# Patient Record
Sex: MALE | Race: WHITE | Employment: STUDENT | URBAN - METROPOLITAN AREA
[De-identification: names, ages, dates, MRNs, and addresses within clinical notes are randomized per-mention and may not be internally consistent; named-entity substitution may affect disease eponyms.]

---

## 2024-07-28 ENCOUNTER — HOSPITAL ENCOUNTER (EMERGENCY)
Facility: HOSPITAL | Age: 19
Discharge: HOME | End: 2024-07-28
Attending: STUDENT IN AN ORGANIZED HEALTH CARE EDUCATION/TRAINING PROGRAM
Payer: COMMERCIAL

## 2024-07-28 VITALS
DIASTOLIC BLOOD PRESSURE: 67 MMHG | TEMPERATURE: 97.3 F | SYSTOLIC BLOOD PRESSURE: 134 MMHG | OXYGEN SATURATION: 98 % | HEART RATE: 62 BPM | WEIGHT: 180.78 LBS | RESPIRATION RATE: 18 BRPM

## 2024-07-28 DIAGNOSIS — S61.215A LACERATION OF LEFT RING FINGER WITHOUT FOREIGN BODY WITHOUT DAMAGE TO NAIL, INITIAL ENCOUNTER: Primary | ICD-10-CM

## 2024-07-28 PROCEDURE — 6370000000 HC RX 637 (ALT 250 FOR IP): Performed by: STUDENT IN AN ORGANIZED HEALTH CARE EDUCATION/TRAINING PROGRAM

## 2024-07-28 PROCEDURE — 99283 EMERGENCY DEPT VISIT LOW MDM: CPT

## 2024-07-28 RX ORDER — IBUPROFEN 600 MG/1
600 TABLET ORAL ONCE
Status: COMPLETED | OUTPATIENT
Start: 2024-07-28 | End: 2024-07-28

## 2024-07-28 RX ADMIN — IBUPROFEN 600 MG: 600 TABLET, FILM COATED ORAL at 22:39

## 2024-07-28 ASSESSMENT — PAIN DESCRIPTION - DESCRIPTORS: DESCRIPTORS: SHARP

## 2024-07-28 ASSESSMENT — PAIN DESCRIPTION - FREQUENCY: FREQUENCY: CONTINUOUS

## 2024-07-28 ASSESSMENT — PAIN DESCRIPTION - LOCATION: LOCATION: FINGER (COMMENT WHICH ONE)

## 2024-07-28 ASSESSMENT — PAIN SCALES - GENERAL: PAINLEVEL_OUTOF10: 5

## 2024-07-28 ASSESSMENT — PAIN DESCRIPTION - PAIN TYPE: TYPE: ACUTE PAIN

## 2024-07-28 ASSESSMENT — PAIN DESCRIPTION - ORIENTATION: ORIENTATION: LEFT

## 2024-07-28 ASSESSMENT — PAIN DESCRIPTION - ONSET: ONSET: ON-GOING

## 2024-07-28 ASSESSMENT — PAIN - FUNCTIONAL ASSESSMENT: PAIN_FUNCTIONAL_ASSESSMENT: ACTIVITIES ARE NOT PREVENTED

## 2024-07-29 NOTE — ED NOTES
Patient educated on return to ED precautions. Patient verbalizes understanding of DC instructions, wound care, & follow up care. Pt awake, alert, & acting age appropriate at DC. No distress noted upon reassessment.

## 2024-07-29 NOTE — DISCHARGE INSTRUCTIONS
You were evaluated in the emergency department after cutting your finger.  Bleeding was controlled with a pressure dressing and that we placed 2 Steri-Strips over to hold the closed.  Please follow-up with your primary care physician in 2 to 3 days please keep the wound dry for the next 24 hours and do not submerge in the pool or bath for at least 2 days.  Return to the emergency department increased pain or any concerns.

## 2024-07-29 NOTE — ED NOTES
ED SIGN OUT NOTE  Care assumed at [facility] 11:42 PM EDT    Patient was signed out to me by Dr. Fierro.     Patient is awaiting Wound check.    /67   Pulse 62   Temp 97.3 °F (36.3 °C) (Tympanic)   Resp 18   Wt 82 kg (180 lb 12.4 oz)   SpO2 98%     Labs Reviewed - No data to display  No orders to display     Patient was signed over to me with a pressure dressing awaiting to see if he controlled bleeding or if he would need to put a suture.  On reevaluation the bleeding was well-controlled so I helped closed it with Steri-Strips, sutures not indicated.  Stable to discharge home and follow-up with primary care physician in 2 to 3 days.  Keep the area dry for 24 hours and do not submerge in the pool or bath for 2 days.    Lac Repair    Date/Time: 7/28/2024 11:43 PM    Performed by: John Ivory MD  Authorized by: Ophelia Fierro DO    Consent:     Consent obtained:  Verbal    Consent given by:  Patient    Risks discussed:  Infection  Universal protocol:     Patient identity confirmed:  Verbally with patient  Anesthesia:     Anesthesia method:  None  Laceration details:     Location:  Finger    Finger location:  L ring finger    Length (cm):  1  Exploration:     Limited defect created (wound extended): no      Hemostasis achieved with:  Direct pressure  Treatment:     Area cleansed with:  Shur-Clens  Skin repair:     Repair method:  Steri-Strips    Number of Steri-Strips:  2  Approximation:     Approximation:  Close  Repair type:     Repair type:  Simple  Post-procedure details:     Dressing:  Bulky dressing    Procedure completion:  Tolerated well, no immediate complications          Diagnosis:   1. Laceration of left ring finger without foreign body without damage to nail, initial encounter        Disposition:   Decision To Discharge 07/28/2024 11:38:30 PM    Plan:   Discharged home with follow-up with primary care physician in 2 to 3 days.  Keep the area dry for 24 hours do not submerge

## 2024-07-29 NOTE — ED TRIAGE NOTES
Pt was at work and stuck hand in  and was cut by something in the . Pt has laceration to third finger on left hand.

## 2024-07-29 NOTE — ED PROVIDER NOTES
findings:        Interpretation per the Radiologist below, if available at the time of this note:    No orders to display        LABS:  Labs Reviewed - No data to display    All other labs were within normal range or not returned as of this dictation.    EMERGENCY DEPARTMENT COURSE and DIFFERENTIAL DIAGNOSIS/MDM:   Vitals:    Vitals:    07/28/24 2148   BP: 134/67   Pulse: 62   Resp: 18   Temp: 97.3 °F (36.3 °C)   TempSrc: Tympanic   SpO2: 98%   Weight: 82 kg (180 lb 12.4 oz)           Medical Decision Making  Patient is a 19-year-old male with no significant past medical history presenting after finger laceration.  On initial exam, degree separation might not warrant suture repair.  1 area still oozing so will apply pressure dressing to evaluate need for suture.  Patient is up-to-date on tetanus.    Risk  Prescription drug management.            REASSESSMENT        11:02 PM  Change of shift.  Care of patient signed over to Dr. Ivory.  Bedside handoff complete. Awaiting wound re-evaluation..         CONSULTS:  None    PROCEDURES:  Unless otherwise noted below, none     Procedures      FINAL IMPRESSION      1. Laceration of left ring finger without foreign body without damage to nail, initial encounter          DISPOSITION/PLAN   DISPOSITION        PATIENT REFERRED TO:  No follow-up provider specified.    DISCHARGE MEDICATIONS:  New Prescriptions    No medications on file         (Please note that portions of this note were completed with a voice recognition program.  Efforts were made to edit the dictations but occasionally words are mis-transcribed.)    Ophelia Fierro DO (electronically signed)  Emergency Attending Physician / Physician Assistant / Nurse Practitioner              Ophelia Fierro DO  07/28/24 6895